# Patient Record
Sex: MALE | HISPANIC OR LATINO | Employment: FULL TIME | URBAN - METROPOLITAN AREA
[De-identification: names, ages, dates, MRNs, and addresses within clinical notes are randomized per-mention and may not be internally consistent; named-entity substitution may affect disease eponyms.]

---

## 2022-08-25 ENCOUNTER — OFFICE VISIT (OUTPATIENT)
Dept: FAMILY MEDICINE | Facility: CLINIC | Age: 37
End: 2022-08-25
Payer: COMMERCIAL

## 2022-08-25 VITALS
RESPIRATION RATE: 16 BRPM | TEMPERATURE: 97.5 F | DIASTOLIC BLOOD PRESSURE: 74 MMHG | WEIGHT: 169.6 LBS | HEART RATE: 76 BPM | OXYGEN SATURATION: 97 % | HEIGHT: 65 IN | BODY MASS INDEX: 28.26 KG/M2 | SYSTOLIC BLOOD PRESSURE: 111 MMHG

## 2022-08-25 DIAGNOSIS — R53.83 FATIGUE, UNSPECIFIED TYPE: ICD-10-CM

## 2022-08-25 DIAGNOSIS — Z11.4 SCREENING FOR HIV (HUMAN IMMUNODEFICIENCY VIRUS): ICD-10-CM

## 2022-08-25 DIAGNOSIS — Z13.220 LIPID SCREENING: ICD-10-CM

## 2022-08-25 DIAGNOSIS — H93.12 TINNITUS, LEFT: ICD-10-CM

## 2022-08-25 DIAGNOSIS — Z71.89 ADVANCED DIRECTIVES, COUNSELING/DISCUSSION: ICD-10-CM

## 2022-08-25 DIAGNOSIS — Z11.59 NEED FOR HEPATITIS C SCREENING TEST: ICD-10-CM

## 2022-08-25 DIAGNOSIS — R20.0 NUMBNESS OF FINGERS: ICD-10-CM

## 2022-08-25 DIAGNOSIS — Z00.00 ROUTINE GENERAL MEDICAL EXAMINATION AT A HEALTH CARE FACILITY: Primary | ICD-10-CM

## 2022-08-25 LAB
ALBUMIN SERPL-MCNC: 4.1 G/DL (ref 3.4–5)
ALP SERPL-CCNC: 71 U/L (ref 40–150)
ALT SERPL W P-5'-P-CCNC: 33 U/L (ref 0–70)
ANION GAP SERPL CALCULATED.3IONS-SCNC: 3 MMOL/L (ref 3–14)
AST SERPL W P-5'-P-CCNC: 26 U/L (ref 0–45)
BILIRUB SERPL-MCNC: 0.6 MG/DL (ref 0.2–1.3)
BUN SERPL-MCNC: 14 MG/DL (ref 7–30)
CALCIUM SERPL-MCNC: 9 MG/DL (ref 8.5–10.1)
CHLORIDE BLD-SCNC: 110 MMOL/L (ref 94–109)
CHOLEST SERPL-MCNC: 169 MG/DL
CO2 SERPL-SCNC: 26 MMOL/L (ref 20–32)
CREAT SERPL-MCNC: 1 MG/DL (ref 0.66–1.25)
ERYTHROCYTE [DISTWIDTH] IN BLOOD BY AUTOMATED COUNT: 13.1 % (ref 10–15)
FASTING STATUS PATIENT QL REPORTED: YES
GFR SERPL CREATININE-BSD FRML MDRD: >90 ML/MIN/1.73M2
GLUCOSE BLD-MCNC: 100 MG/DL (ref 70–99)
HCT VFR BLD AUTO: 44.1 % (ref 40–53)
HDLC SERPL-MCNC: 54 MG/DL
HGB BLD-MCNC: 15.3 G/DL (ref 13.3–17.7)
LDLC SERPL CALC-MCNC: 101 MG/DL
MCH RBC QN AUTO: 30 PG (ref 26.5–33)
MCHC RBC AUTO-ENTMCNC: 34.7 G/DL (ref 31.5–36.5)
MCV RBC AUTO: 87 FL (ref 78–100)
NONHDLC SERPL-MCNC: 115 MG/DL
PLATELET # BLD AUTO: 245 10E3/UL (ref 150–450)
POTASSIUM BLD-SCNC: 4.4 MMOL/L (ref 3.4–5.3)
PROT SERPL-MCNC: 7.4 G/DL (ref 6.8–8.8)
RBC # BLD AUTO: 5.1 10E6/UL (ref 4.4–5.9)
SODIUM SERPL-SCNC: 139 MMOL/L (ref 133–144)
TRIGL SERPL-MCNC: 68 MG/DL
TSH SERPL DL<=0.005 MIU/L-ACNC: 0.57 MU/L (ref 0.4–4)
VIT B12 SERPL-MCNC: 422 PG/ML (ref 232–1245)
WBC # BLD AUTO: 4.2 10E3/UL (ref 4–11)

## 2022-08-25 PROCEDURE — 99385 PREV VISIT NEW AGE 18-39: CPT | Performed by: FAMILY MEDICINE

## 2022-08-25 PROCEDURE — 85027 COMPLETE CBC AUTOMATED: CPT | Performed by: FAMILY MEDICINE

## 2022-08-25 PROCEDURE — 82607 VITAMIN B-12: CPT | Performed by: FAMILY MEDICINE

## 2022-08-25 PROCEDURE — 99214 OFFICE O/P EST MOD 30 MIN: CPT | Mod: 25 | Performed by: FAMILY MEDICINE

## 2022-08-25 PROCEDURE — 36415 COLL VENOUS BLD VENIPUNCTURE: CPT | Performed by: FAMILY MEDICINE

## 2022-08-25 PROCEDURE — 80061 LIPID PANEL: CPT | Performed by: FAMILY MEDICINE

## 2022-08-25 PROCEDURE — 86803 HEPATITIS C AB TEST: CPT | Performed by: FAMILY MEDICINE

## 2022-08-25 PROCEDURE — 87389 HIV-1 AG W/HIV-1&-2 AB AG IA: CPT | Performed by: FAMILY MEDICINE

## 2022-08-25 PROCEDURE — 92551 PURE TONE HEARING TEST AIR: CPT | Performed by: FAMILY MEDICINE

## 2022-08-25 PROCEDURE — 84443 ASSAY THYROID STIM HORMONE: CPT | Performed by: FAMILY MEDICINE

## 2022-08-25 PROCEDURE — 96127 BRIEF EMOTIONAL/BEHAV ASSMT: CPT | Performed by: FAMILY MEDICINE

## 2022-08-25 PROCEDURE — 80053 COMPREHEN METABOLIC PANEL: CPT | Performed by: FAMILY MEDICINE

## 2022-08-25 ASSESSMENT — ANXIETY QUESTIONNAIRES
7. FEELING AFRAID AS IF SOMETHING AWFUL MIGHT HAPPEN: NOT AT ALL
GAD7 TOTAL SCORE: 5
6. BECOMING EASILY ANNOYED OR IRRITABLE: MORE THAN HALF THE DAYS
2. NOT BEING ABLE TO STOP OR CONTROL WORRYING: SEVERAL DAYS
5. BEING SO RESTLESS THAT IT IS HARD TO SIT STILL: NOT AT ALL
3. WORRYING TOO MUCH ABOUT DIFFERENT THINGS: SEVERAL DAYS
GAD7 TOTAL SCORE: 5
IF YOU CHECKED OFF ANY PROBLEMS ON THIS QUESTIONNAIRE, HOW DIFFICULT HAVE THESE PROBLEMS MADE IT FOR YOU TO DO YOUR WORK, TAKE CARE OF THINGS AT HOME, OR GET ALONG WITH OTHER PEOPLE: VERY DIFFICULT
1. FEELING NERVOUS, ANXIOUS, OR ON EDGE: SEVERAL DAYS

## 2022-08-25 ASSESSMENT — ENCOUNTER SYMPTOMS
DIZZINESS: 1
HEARTBURN: 1
SHORTNESS OF BREATH: 0
PALPITATIONS: 0
HEMATOCHEZIA: 0
HEADACHES: 0
SORE THROAT: 0
EYE PAIN: 0
DYSURIA: 0
JOINT SWELLING: 0
DIARRHEA: 0
ARTHRALGIAS: 1
WEAKNESS: 1
FEVER: 0
CONSTIPATION: 0
FREQUENCY: 0
CHILLS: 0
NERVOUS/ANXIOUS: 0
NAUSEA: 0
MYALGIAS: 1
PARESTHESIAS: 1
ABDOMINAL PAIN: 0
HEMATURIA: 0
COUGH: 0

## 2022-08-25 ASSESSMENT — PATIENT HEALTH QUESTIONNAIRE - PHQ9
5. POOR APPETITE OR OVEREATING: NOT AT ALL
SUM OF ALL RESPONSES TO PHQ QUESTIONS 1-9: 9

## 2022-08-25 NOTE — PROGRESS NOTES
SUBJECTIVE:   CC: Rodri Emanuel is an 36 year old male who presents for preventative health visit.       Patient has been advised of split billing requirements and indicates understanding: Yes  Healthy Habits:     Getting at least 3 servings of Calcium per day:  Yes    Bi-annual eye exam:  NO    Dental care twice a year:  Yes    Sleep apnea or symptoms of sleep apnea:  Daytime drowsiness    Diet:  Regular (no restrictions)    Frequency of exercise:  None    Taking medications regularly:  Yes    Medication side effects:  Not applicable    PHQ-2 Total Score: 3    Additional concerns today:  Yes    New patient.    Asked patient- Anything else that will need to be discussed during this visit that I should make note of for the provider?                          Patient's Reply: yes    Numbness of fingers  Reporting new onset of symptoms that he has had since January or February of this year including numbness in hands/ fingers bilaterally- this occurs at night when is laying down, this improves once he gets up and is moving around.  Also having issues with fatigue and not feeling he has the energy like he used to.       Ear Problem- Left  Will hear a buzzing at times with left ear, this also will block the noise.     States that he is otherwise healthy and has no known chronic medical problems.  Is not on any chronic meds.    Today's PHQ-2 Score:   PHQ-2 ( 1999 [employer]) 8/25/2022   Q1: Little interest or pleasure in doing things 0   Q2: Feeling down, depressed or hopeless 0   PHQ-2 Score 0   Q1: Little interest or pleasure in doing things Not at all   Q2: Feeling down, depressed or hopeless Not at all   PHQ-2 Score 0       Abuse: Current or Past(Physical, Sexual or Emotional)- No  Do you feel safe in your environment? Yes    Have you ever done Advance Care Planning? (For example, a Health Directive, POLST, or a discussion with a medical provider or your loved ones about your wishes): No, advance care planning  information given to patient to review.  Patient plans to discuss their wishes with loved ones or provider.      Social History     Tobacco Use     Smoking status: Never Smoker     Smokeless tobacco: Never Used   Substance Use Topics     Alcohol use: Not Currently     If you drink alcohol do you typically have >3 drinks per day or >7 drinks per week? No    Alcohol Use 8/25/2022   Prescreen: >3 drinks/day or >7 drinks/week? Not Applicable   Prescreen: >3 drinks/day or >7 drinks/week? -   No flowsheet data found.    Last PSA: No results found for: PSA    Reviewed orders with patient. Reviewed health maintenance and updated orders accordingly - Yes  Lab work is in process  Labs reviewed in EPIC  BP Readings from Last 3 Encounters:   08/25/22 111/74    Wt Readings from Last 3 Encounters:   08/25/22 76.9 kg (169 lb 9.6 oz)                  Patient Active Problem List   Diagnosis     Tinnitus, left     Numbness of fingers     Past Surgical History:   Procedure Laterality Date     NO HISTORY OF SURGERY         Social History     Tobacco Use     Smoking status: Never Smoker     Smokeless tobacco: Never Used   Substance Use Topics     Alcohol use: Not Currently     Family History   Problem Relation Age of Onset     Diabetes Mother          No current outpatient medications on file.     No Known Allergies    Reviewed and updated as needed this visit by clinical staff   Tobacco  Allergies  Meds  Problems  Med Hx  Surg Hx  Fam Hx  Soc   Hx          Reviewed and updated as needed this visit by Provider   Tobacco    Problems  Med Hx  Surg Hx  Fam Hx               Review of Systems   Constitutional: Negative for chills and fever.   HENT: Positive for ear pain and hearing loss. Negative for congestion and sore throat.    Eyes: Negative for pain and visual disturbance.   Respiratory: Negative for cough and shortness of breath.    Cardiovascular: Negative for chest pain, palpitations and peripheral edema.  "  Gastrointestinal: Positive for heartburn. Negative for abdominal pain, constipation, diarrhea, hematochezia and nausea.   Genitourinary: Positive for impotence. Negative for dysuria, frequency, genital sores, hematuria, penile discharge and urgency.   Musculoskeletal: Positive for arthralgias and myalgias. Negative for joint swelling.   Skin: Negative for rash.   Neurological: Positive for dizziness, weakness and paresthesias. Negative for headaches.   Psychiatric/Behavioral: Positive for mood changes. The patient is not nervous/anxious.      OBJECTIVE:   /74   Pulse 76   Temp 97.5  F (36.4  C) (Tympanic)   Resp 16   Ht 1.66 m (5' 5.35\")   Wt 76.9 kg (169 lb 9.6 oz)   SpO2 97%   BMI 27.92 kg/m      Physical Exam  GENERAL: healthy, alert and no distress  EYES: Eyes grossly normal to inspection, PERRL and conjunctivae and sclerae normal  HENT: ear canals and TM's normal, nose and mouth without ulcers or lesions  NECK: no adenopathy, no asymmetry, masses, or scars and thyroid normal to palpation  RESP: lungs clear to auscultation - no rales, rhonchi or wheezes  CV: regular rate and rhythm, normal S1 S2, no S3 or S4, no murmur, click or rub, no peripheral edema and peripheral pulses strong  ABDOMEN: soft, nontender, no hepatosplenomegaly, no masses and bowel sounds normal.  Negative Tinel's and Phalen's test.  MS: no gross musculoskeletal defects noted, no edema  SKIN: no suspicious lesions or rashes  NEURO: Normal strength and tone, mentation intact and speech normal  PSYCH: mentation appears normal, affect normal/bright    Diagnostic Test Results:  Labs drawn and in process  Hearing screen was normal-see nursing notes for detail  ASSESSMENT/PLAN:   Rodri was seen today for physical.    Diagnoses and all orders for this visit:    Routine general medical examination at a health care facility  -     REVIEW OF HEALTH MAINTENANCE PROTOCOL ORDERS  -     TSH with free T4 reflex  -     Comprehensive metabolic " "panel (BMP + Alb, Alk Phos, ALT, AST, Total. Bili, TP)  -     CBC with platelets    Lipid screening  -     Lipid panel reflex to direct LDL Non-fasting    Screening for HIV (human immunodeficiency virus)  -     HIV Antigen Antibody Combo    Need for hepatitis C screening test  -     Hepatitis C Screen Reflex to HCV RNA Quant and Genotype    Tinnitus, left  -     HEARING SCREENING- normal    Fatigue, unspecified type  -     TSH with free T4 reflex  -     Comprehensive metabolic panel (BMP + Alb, Alk Phos, ALT, AST, Total. Bili, TP)  -     CBC with platelets  -     PHQ-9/LELE 7 completed, see Epic for details        Numbness of fingers- differentials to include -vitamin B12 deficiency, thyroid disorder or carpal tunnel syndrome  -     Vitamin B12  -     TSH with free T4 reflex  -     If labs are normal and symptoms persist will consider referral for nerve conduction studies    Advanced directives, counseling/discussion       -     I have verified the patient's ablity to prepare an advanced directive/make health care decisions.    Literature was provided to assist patient in preparing an advanced directive.        Patient has been advised of split billing requirements and indicates understanding: Yes    COUNSELING:   Reviewed preventive health counseling, as reflected in patient instructions       Regular exercise       Healthy diet/nutrition    Estimated body mass index is 27.92 kg/m  as calculated from the following:    Height as of this encounter: 1.66 m (5' 5.35\").    Weight as of this encounter: 76.9 kg (169 lb 9.6 oz).     Weight management plan: Discussed healthy diet and exercise guidelines    He reports that he has never smoked. He has never used smokeless tobacco.      Counseling Resources:  ATP IV Guidelines  Pooled Cohorts Equation Calculator  FRAX Risk Assessment  ICSI Preventive Guidelines  Dietary Guidelines for Americans, 2010  USDA's MyPlate  ASA Prophylaxis  Lung CA Screening    Follow up in 2 weeks " follow up to discuss next steps.   Next Annual Physical due in 8 /2023    Elizabet Durán MD  [facility]

## 2022-08-25 NOTE — NURSING NOTE
HEARING FREQUENCY    Right Ear:      1000 Hz RESPONSE- on Level: 40 db (Conditioning sound)   1000 Hz: RESPONSE- on Level:   20 db    2000 Hz: RESPONSE- on Level:   20 db    4000 Hz: RESPONSE- on Level:   20 db   6000 Hz: RESPONSE- on Level:   20 db   8000 Hz: RESPONSE- on Level:   20 db     Left Ear:     8000 Hz: RESPONSE- on Level:   20 db   6000 Hz: RESPONSE- on Level:   20 db    4000 Hz: RESPONSE- on Level:   20 db    2000 Hz: RESPONSE- on Level:   20 db    1000 Hz: RESPONSE- on Level:   20 db     500 Hz: RESPONSE- on Level: 25 db    Right Ear:    500 Hz: RESPONSE- on Level: 25 db    Hearing Acuity: Pass    Hearing Assessment: normal    Melvi Baez MA

## 2022-08-26 LAB
HCV AB SERPL QL IA: NONREACTIVE
HIV 1+2 AB+HIV1 P24 AG SERPL QL IA: NONREACTIVE

## 2022-08-30 ENCOUNTER — TELEPHONE (OUTPATIENT)
Dept: FAMILY MEDICINE | Facility: CLINIC | Age: 37
End: 2022-08-30

## 2022-08-30 DIAGNOSIS — R20.0 NUMBNESS OF FINGERS: Primary | ICD-10-CM

## 2022-08-30 NOTE — TELEPHONE ENCOUNTER
Left message on voice mail [phone] for patient to call clinic. [phone] see result note below per Dr Luis Armando Durán MD   8/30/2022  1:16 PM CDT         Rodri-     Your recent labs looked good.      Screening HIV and Hepatitis C screen tests were negative.   Complete blood count was normal.   Complete Metabolic Panel (panel that checks liver function, kidney function and electrolytes) was normal. Glucose was minimally elevated - no evidence of diabetes at this time.   Cholesterol panel was normal.     Vitamin B12 and Thyroid function tests were normal- I recommend we proceed with Nerve Conduction studies to further evaluate the numbness in your fingers.   If agreeable, let me know and I will send in a referral.      Please contact me if you have any additional questions or concerns.     Elizabet Durán MD

## 2022-08-31 NOTE — TELEPHONE ENCOUNTER
For EMG scheduling, [facility] will call you to coordinate your care as prescribed by your provider. If you don't hear from a representative within 2 business days, please call [phone].    Called to notify patient of the information above.  He stated that he has this scheduled already for Nov.    He would like to know if provider still wants to see him as scheduled on 9/9/22.    Lynda RN,BSN  Triage Nurse  [facility]: [place] [facility]  Ph: [phone]

## 2022-08-31 NOTE — TELEPHONE ENCOUNTER
Elizabet Durán MD 20 minutes ago (12:46 PM)     CB       Noted.      EMG orders completed.

## 2022-08-31 NOTE — TELEPHONE ENCOUNTER
Notified patient on his results below.  Patient declined .    Patient stated understanding and agreeable with the plan of care.    He stated that he will be willing to proceed with the nerve conduction studies, and would like a call back when referral is placed.    Routed to PCP.    Lynda DAVISON,BSN  Triage Nurse  [facility]: [facility]

## 2022-09-01 NOTE — TELEPHONE ENCOUNTER
Noted.     Can reschedule appointment to a week after EMG is completed to discuss results and next steps.

## 2022-11-04 ENCOUNTER — OFFICE VISIT (OUTPATIENT)
Dept: NEUROLOGY | Facility: CLINIC | Age: 37
End: 2022-11-04
Attending: FAMILY MEDICINE
Payer: COMMERCIAL

## 2022-11-04 DIAGNOSIS — R20.0 NUMBNESS OF FINGERS: ICD-10-CM

## 2022-11-04 PROCEDURE — 95886 MUSC TEST DONE W/N TEST COMP: CPT | Performed by: PHYSICAL MEDICINE & REHABILITATION

## 2022-11-04 PROCEDURE — 95910 NRV CNDJ TEST 7-8 STUDIES: CPT | Performed by: PHYSICAL MEDICINE & REHABILITATION

## 2022-11-04 NOTE — PROGRESS NOTES
[facility]  Electrodiagnostic Laboratory                 Department of Neurology                                                                                                         Test Date:  2022    Patient: Rodri Emanuel : 1985 Physician: Martha Spears MD   Sex: Male AGE: 37 year Ref Phys: Elizabet Durán MD   ID#: [id number]   Technician: Zuhair Gill     Clinical Information:  Rodri Emanuel is a 36 yo male who presents with paresthesia in his fingers b/l. Query peripheral mononeuropathies.     Techniques:  Motor and sensory conduction studies were done with surface recording electrodes. EMG was done with a concentric needle electrode.     Results:  All the nerve conduction studies (as indicated in the following tables) were within normal limits.      All examined muscles (as indicated in the following table) showed no evidence of electrical instability.        Interpretation:  Normal examination.    --There is no electrodiagnostic evidence of median or ulnar mononeuropathy in either upper extremity.    --There is no electrodiagnostic evidence of left upper extremity motor radiculopathy or plexopathy.      ___________________________  Martha Spears MD        Nerve Conduction Studies  Motor Sites      Latency Amplitude Neg. Amp Diff Segment Distance Velocity Neg. Dur Neg Area Diff Temperature Comment   Site (ms) Norm (mV) Norm %  cm m/s Norm ms %  C    Left Median (APB) Motor   Wrist 3.2  < 4.4 8.1  > 5.0  Wrist-APB 8   5.3  35    Elbow 7.5 - 8.1 - 0 Elbow-Wrist 22 51  > 48 5.1 -0.76 35.1    Right Median (APB) Motor   Wrist 3.3  < 4.4 8.5  > 5.0  Wrist-APB 8   5.5  33.3    Elbow 7.8 - 8.3 - -2.4 Elbow-Wrist 23.8 53  > 48 5.2 -6.5 33.3    Left Median/Ulnar (Lumb-Dors Int II) Motor        Median (Lumb I)   Wrist 3.1 - 1.45 -      5.7  35         Ulnar (Dorsal Int (manus))   Wrist 3.0 - 2.9 -      4.9  35.1    Right Median/Ulnar (Lumb-Dors  Int II) Motor        Median (Lumb I)   Wrist 3.2 - 1.69 -      5.6  33.4         Ulnar (Dorsal Int (manus))   Wrist 2.9 - 2.4 -      5.7  33.4    Left Ulnar (ADM) Motor   Wrist 2.7  < 3.5 13.1  > 5.0  Wrist-ADM 8   5.0  34    Bel Elbow 5.9 - 12.5 - -4.6 Bel Elbow-Wrist 19.2 60  > 48 5.1 -4.2 33.9    Abv Elbow 8.1 - 12.1 - -3.2 Abv Elbow-Bel Elbow 12.5 57  > 48 5.7 -2.0 33.7    Right Ulnar (ADM) Motor   Wrist 2.7  < 3.5 13.4  > 5.0  Wrist-ADM 8   4.7  32.8    Bel Elbow 6.4 - 12.5 - -6.7 Bel Elbow-Wrist 20.3 55  > 48 4.8 -3.9 32.7    Abv Elbow 8.6 - 11.7 - -6.4 Abv Elbow-Bel Elbow 12.4 56  > 48 5.0 -1.23 32.6      Sensory Sites      Onset Lat Peak Lat Amp (O-P) Amp (P-P) Segment Distance Velocity Temperature Comment   Site ms ms  V Norm  V  cm m/s Norm  C    Left Median Sensory   Wrist-Dig II 2.5 3.0 22  > 10 19 Wrist-Dig II 14 56  > 48 33.2    Right Median Sensory   Wrist-Dig II 2.6 3.3 13  > 10 23 Wrist-Dig II 14 54  > 48 32.8    Left Median-Ulnar Palmar Sensory        Median   Palm-Wrist 1.40 1.88 22 - 44 Palm-Wrist - - - 34.7         Ulnar   Palm-Wrist 1.35 1.78 3 - 7 Palm-Wrist - - - 34.9    Right Median-Ulnar Palmar Sensory        Median   Palm-Wrist 1.48 2.1 22 - 27 Palm-Wrist - - - 33.3         Ulnar   Palm-Wrist 1.30 2.0 8 - 4 Palm-Wrist - - - 33.3    Left Ulnar Sensory   Wrist-Dig V 2.4 3.1 13  > 8 9 Wrist-Dig V 12.5 52  > 48 33.8    Right Ulnar Sensory   Wrist-Dig V 2.3 3.2 22  > 8 12 Wrist-Dig V 12.5 54  > 48 33      Inter-Nerve Comparisons     Nerve 1 Value 1 Nerve 2 Value 2 Parameter Result Normal   Sensory Sites   R Median Palm-Wrist 2.1 ms R Ulnar Palm-Wrist 2.0 ms Peak Lat Diff 0.10 ms <0.40   L Median Palm-Wrist 1.9 ms L Ulnar Palm-Wrist 1.8 ms Peak Lat Diff 0.10 ms <0.40       Electromyography     Side Muscle Ins Act Fibs/PSW Fasc HF Amp Dur Poly Recrt Int Pat   Left Deltoid Nml None Nml 0 Nml Nml 0 Nml Nml   Left Biceps Nml None Nml 0 Nml Nml 0 Nml Nml   Left Triceps Nml None Nml 0 Nml Nml 0 Nml  Nml   Left Pronator Teres Nml None Nml 0 Nml Nml 0 Nml Nml   Left FDI Nml None Nml 0 Nml Nml 0 Nml Nml   Left EIP Nml None Nml 0 Nml Nml 0 Nml Nml         NCS Waveforms:    Motor                    Sensory

## 2022-11-04 NOTE — LETTER
2022       RE: Rodri Emanuel  [address]  [place] [address]     Dear Colleague,    Thank you for referring your patient, Rodri Emanuel, to the [facility] EMG CLINIC [place] at [facility]. Please see a copy of my visit note below.                            [facility]  Electrodiagnostic Laboratory                 Department of Neurology                                                                                                         Test Date:  2022    Patient: Rodri Emanuel : 1985 Physician: Martha Spears MD   Sex: Male AGE: 37 year Ref Phys: Elizabet Durán MD   ID#: [id number]   Technician: Zuhair Gill     Clinical Information:  Rodri Emanuel is a 38 yo male who presents with paresthesia in his fingers b/l. Query peripheral mononeuropathies.     Techniques:  Motor and sensory conduction studies were done with surface recording electrodes. EMG was done with a concentric needle electrode.     Results:  All the nerve conduction studies (as indicated in the following tables) were within normal limits.      All examined muscles (as indicated in the following table) showed no evidence of electrical instability.        Interpretation:  Normal examination.    --There is no electrodiagnostic evidence of median or ulnar mononeuropathy in either upper extremity.    --There is no electrodiagnostic evidence of left upper extremity motor radiculopathy or plexopathy.      ___________________________  Martha Spears MD        Nerve Conduction Studies  Motor Sites      Latency Amplitude Neg. Amp Diff Segment Distance Velocity Neg. Dur Neg Area Diff Temperature Comment   Site (ms) Norm (mV) Norm %  cm m/s Norm ms %  C    Left Median (APB) Motor   Wrist 3.2  < 4.4 8.1  > 5.0  Wrist-APB 8   5.3  35    Elbow 7.5 - 8.1 - 0 Elbow-Wrist 22 51  > 48 5.1 -0.76 35.1    Right Median (APB) Motor   Wrist 3.3   < 4.4 8.5  > 5.0  Wrist-APB 8   5.5  33.3    Elbow 7.8 - 8.3 - -2.4 Elbow-Wrist 23.8 53  > 48 5.2 -6.5 33.3    Left Median/Ulnar (Lumb-Dors Int II) Motor        Median (Lumb I)   Wrist 3.1 - 1.45 -      5.7  35         Ulnar (Dorsal Int (manus))   Wrist 3.0 - 2.9 -      4.9  35.1    Right Median/Ulnar (Lumb-Dors Int II) Motor        Median (Lumb I)   Wrist 3.2 - 1.69 -      5.6  33.4         Ulnar (Dorsal Int (manus))   Wrist 2.9 - 2.4 -      5.7  33.4    Left Ulnar (ADM) Motor   Wrist 2.7  < 3.5 13.1  > 5.0  Wrist-ADM 8   5.0  34    Bel Elbow 5.9 - 12.5 - -4.6 Bel Elbow-Wrist 19.2 60  > 48 5.1 -4.2 33.9    Abv Elbow 8.1 - 12.1 - -3.2 Abv Elbow-Bel Elbow 12.5 57  > 48 5.7 -2.0 33.7    Right Ulnar (ADM) Motor   Wrist 2.7  < 3.5 13.4  > 5.0  Wrist-ADM 8   4.7  32.8    Bel Elbow 6.4 - 12.5 - -6.7 Bel Elbow-Wrist 20.3 55  > 48 4.8 -3.9 32.7    Abv Elbow 8.6 - 11.7 - -6.4 Abv Elbow-Bel Elbow 12.4 56  > 48 5.0 -1.23 32.6      Sensory Sites      Onset Lat Peak Lat Amp (O-P) Amp (P-P) Segment Distance Velocity Temperature Comment   Site ms ms  V Norm  V  cm m/s Norm  C    Left Median Sensory   Wrist-Dig II 2.5 3.0 22  > 10 19 Wrist-Dig II 14 56  > 48 33.2    Right Median Sensory   Wrist-Dig II 2.6 3.3 13  > 10 23 Wrist-Dig II 14 54  > 48 32.8    Left Median-Ulnar Palmar Sensory        Median   Palm-Wrist 1.40 1.88 22 - 44 Palm-Wrist - - - 34.7         Ulnar   Palm-Wrist 1.35 1.78 3 - 7 Palm-Wrist - - - 34.9    Right Median-Ulnar Palmar Sensory        Median   Palm-Wrist 1.48 2.1 22 - 27 Palm-Wrist - - - 33.3         Ulnar   Palm-Wrist 1.30 2.0 8 - 4 Palm-Wrist - - - 33.3    Left Ulnar Sensory   Wrist-Dig V 2.4 3.1 13  > 8 9 Wrist-Dig V 12.5 52  > 48 33.8    Right Ulnar Sensory   Wrist-Dig V 2.3 3.2 22  > 8 12 Wrist-Dig V 12.5 54  > 48 33      Inter-Nerve Comparisons     Nerve 1 Value 1 Nerve 2 Value 2 Parameter Result Normal   Sensory Sites   R Median Palm-Wrist 2.1 ms R Ulnar Palm-Wrist 2.0 ms Peak Lat Diff 0.10 ms <0.40    L Median Palm-Wrist 1.9 ms L Ulnar Palm-Wrist 1.8 ms Peak Lat Diff 0.10 ms <0.40       Electromyography     Side Muscle Ins Act Fibs/PSW Fasc HF Amp Dur Poly Recrt Int Pat   Left Deltoid Nml None Nml 0 Nml Nml 0 Nml Nml   Left Biceps Nml None Nml 0 Nml Nml 0 Nml Nml   Left Triceps Nml None Nml 0 Nml Nml 0 Nml Nml   Left Pronator Teres Nml None Nml 0 Nml Nml 0 Nml Nml   Left FDI Nml None Nml 0 Nml Nml 0 Nml Nml   Left EIP Nml None Nml 0 Nml Nml 0 Nml Nml         NCS Waveforms:    Motor                    Sensory                            Again, thank you for allowing me to participate in the care of your patient.      Sincerely,    Martha Spears MD

## 2022-11-15 NOTE — PATIENT INSTRUCTIONS
Preventive Health Recommendations  Male Ages 26 - 39    Yearly exam:             See your health care provider every year in order to  o   Review health changes.   o   Discuss preventive care.    o   Review your medicines if your doctor has prescribed any.    You should be tested each year for STDs (sexually transmitted diseases), if you re at risk.     After age 35, talk to your provider about cholesterol testing. If you are at risk for heart disease, have your cholesterol tested at least every 5 years.     If you are at risk for diabetes, you should have a diabetes test (fasting glucose).  Shots: Get a flu shot each year. Get a tetanus shot every 10 years.     Nutrition:    Eat at least 5 servings of fruits and vegetables daily.     Eat whole-grain bread, whole-wheat pasta and brown rice instead of white grains and rice.     Get adequate Calcium and Vitamin D.     Lifestyle    Exercise for at least 150 minutes a week (30 minutes a day, 5 days a week). This will help you control your weight and prevent disease.     Limit alcohol to one drink per day.     No smoking.     Wear sunscreen to prevent skin cancer.     See your dentist every six months for an exam and cleaning.      hard copy, drawn during this pregnancy

## 2023-05-14 ENCOUNTER — HEALTH MAINTENANCE LETTER (OUTPATIENT)
Age: 38
End: 2023-05-14

## 2023-07-26 ENCOUNTER — PATIENT OUTREACH (OUTPATIENT)
Dept: CARE COORDINATION | Facility: CLINIC | Age: 38
End: 2023-07-26
Payer: COMMERCIAL

## 2023-08-09 ENCOUNTER — PATIENT OUTREACH (OUTPATIENT)
Dept: CARE COORDINATION | Facility: CLINIC | Age: 38
End: 2023-08-09
Payer: COMMERCIAL

## 2023-10-15 ENCOUNTER — HEALTH MAINTENANCE LETTER (OUTPATIENT)
Age: 38
End: 2023-10-15

## 2024-10-02 ENCOUNTER — HOSPITAL ENCOUNTER (EMERGENCY)
Facility: CLINIC | Age: 39
Discharge: HOME | End: 2024-10-02
Attending: NURSE PRACTITIONER | Admitting: NURSE PRACTITIONER
Payer: COMMERCIAL

## 2024-10-02 VITALS
HEIGHT: 66 IN | DIASTOLIC BLOOD PRESSURE: 95 MMHG | BODY MASS INDEX: 27.32 KG/M2 | OXYGEN SATURATION: 97 % | SYSTOLIC BLOOD PRESSURE: 142 MMHG | RESPIRATION RATE: 20 BRPM | WEIGHT: 170 LBS | HEART RATE: 84 BPM | TEMPERATURE: 98.2 F

## 2024-10-02 DIAGNOSIS — R42 LIGHTHEADEDNESS: ICD-10-CM

## 2024-10-02 DIAGNOSIS — M54.2 NECK PAIN: ICD-10-CM

## 2024-10-02 DIAGNOSIS — R03.0 ELEVATED BLOOD PRESSURE READING WITHOUT DIAGNOSIS OF HYPERTENSION: ICD-10-CM

## 2024-10-02 LAB
ALBUMIN SERPL BCG-MCNC: 4.5 G/DL (ref 3.5–5.2)
ALBUMIN UR-MCNC: NEGATIVE MG/DL
ALP SERPL-CCNC: 89 U/L (ref 40–150)
ALT SERPL W P-5'-P-CCNC: 72 U/L (ref 0–70)
ANION GAP SERPL CALCULATED.3IONS-SCNC: 15 MMOL/L (ref 7–15)
APPEARANCE UR: CLEAR
AST SERPL W P-5'-P-CCNC: 48 U/L (ref 0–45)
BACTERIA #/AREA URNS HPF: ABNORMAL /HPF
BASOPHILS # BLD AUTO: 0 10E3/UL (ref 0–0.2)
BASOPHILS NFR BLD AUTO: 0 %
BILIRUB SERPL-MCNC: 0.4 MG/DL
BILIRUB UR QL STRIP: NEGATIVE
BUN SERPL-MCNC: 9.6 MG/DL (ref 6–20)
CALCIUM SERPL-MCNC: 9.1 MG/DL (ref 8.8–10.4)
CHLORIDE SERPL-SCNC: 99 MMOL/L (ref 98–107)
COLOR UR AUTO: COLORLESS
CREAT SERPL-MCNC: 0.95 MG/DL (ref 0.67–1.17)
EGFRCR SERPLBLD CKD-EPI 2021: >90 ML/MIN/1.73M2
EOSINOPHIL # BLD AUTO: 0 10E3/UL (ref 0–0.7)
EOSINOPHIL NFR BLD AUTO: 1 %
ERYTHROCYTE [DISTWIDTH] IN BLOOD BY AUTOMATED COUNT: 12.2 % (ref 10–15)
EST. AVERAGE GLUCOSE BLD GHB EST-MCNC: 114 MG/DL
GLUCOSE SERPL-MCNC: 105 MG/DL (ref 70–99)
GLUCOSE UR STRIP-MCNC: NEGATIVE MG/DL
HBA1C MFR BLD: 5.6 %
HCO3 SERPL-SCNC: 22 MMOL/L (ref 22–29)
HCT VFR BLD AUTO: 43.7 % (ref 40–53)
HGB BLD-MCNC: 15.5 G/DL (ref 13.3–17.7)
HGB UR QL STRIP: NEGATIVE
IMM GRANULOCYTES # BLD: 0 10E3/UL
IMM GRANULOCYTES NFR BLD: 0 %
INR PPP: 1.15 (ref 0.85–1.15)
KETONES UR STRIP-MCNC: NEGATIVE MG/DL
LEUKOCYTE ESTERASE UR QL STRIP: NEGATIVE
LYMPHOCYTES # BLD AUTO: 1.6 10E3/UL (ref 0.8–5.3)
LYMPHOCYTES NFR BLD AUTO: 22 %
MCH RBC QN AUTO: 32 PG (ref 26.5–33)
MCHC RBC AUTO-ENTMCNC: 35.5 G/DL (ref 31.5–36.5)
MCV RBC AUTO: 90 FL (ref 78–100)
MONOCYTES # BLD AUTO: 0.7 10E3/UL (ref 0–1.3)
MONOCYTES NFR BLD AUTO: 10 %
NEUTROPHILS # BLD AUTO: 4.8 10E3/UL (ref 1.6–8.3)
NEUTROPHILS NFR BLD AUTO: 67 %
NITRATE UR QL: NEGATIVE
NRBC # BLD AUTO: 0 10E3/UL
NRBC BLD AUTO-RTO: 0 /100
PH UR STRIP: 7 [PH] (ref 5–7)
PLATELET # BLD AUTO: 283 10E3/UL (ref 150–450)
POTASSIUM SERPL-SCNC: 3.9 MMOL/L (ref 3.4–5.3)
PROT SERPL-MCNC: 7.1 G/DL (ref 6.4–8.3)
RBC # BLD AUTO: 4.84 10E6/UL (ref 4.4–5.9)
RBC URINE: 0 /HPF
SODIUM SERPL-SCNC: 136 MMOL/L (ref 135–145)
SP GR UR STRIP: 1 (ref 1–1.03)
UROBILINOGEN UR STRIP-MCNC: NORMAL MG/DL
WBC # BLD AUTO: 7.2 10E3/UL (ref 4–11)
WBC URINE: 0 /HPF

## 2024-10-02 PROCEDURE — 93010 ELECTROCARDIOGRAM REPORT: CPT | Performed by: NURSE PRACTITIONER

## 2024-10-02 PROCEDURE — 99284 EMERGENCY DEPT VISIT MOD MDM: CPT

## 2024-10-02 PROCEDURE — 99284 EMERGENCY DEPT VISIT MOD MDM: CPT | Performed by: NURSE PRACTITIONER

## 2024-10-02 PROCEDURE — 83036 HEMOGLOBIN GLYCOSYLATED A1C: CPT | Performed by: NURSE PRACTITIONER

## 2024-10-02 PROCEDURE — 258N000003 HC RX IP 258 OP 636: Performed by: NURSE PRACTITIONER

## 2024-10-02 PROCEDURE — 250N000013 HC RX MED GY IP 250 OP 250 PS 637: Performed by: NURSE PRACTITIONER

## 2024-10-02 PROCEDURE — 93005 ELECTROCARDIOGRAM TRACING: CPT

## 2024-10-02 PROCEDURE — 96361 HYDRATE IV INFUSION ADD-ON: CPT

## 2024-10-02 PROCEDURE — 36415 COLL VENOUS BLD VENIPUNCTURE: CPT | Performed by: NURSE PRACTITIONER

## 2024-10-02 PROCEDURE — 96360 HYDRATION IV INFUSION INIT: CPT

## 2024-10-02 PROCEDURE — 85610 PROTHROMBIN TIME: CPT | Performed by: NURSE PRACTITIONER

## 2024-10-02 PROCEDURE — 80053 COMPREHEN METABOLIC PANEL: CPT | Performed by: NURSE PRACTITIONER

## 2024-10-02 PROCEDURE — 85025 COMPLETE CBC W/AUTO DIFF WBC: CPT | Performed by: NURSE PRACTITIONER

## 2024-10-02 PROCEDURE — 81003 URINALYSIS AUTO W/O SCOPE: CPT | Performed by: NURSE PRACTITIONER

## 2024-10-02 RX ORDER — ACETAMINOPHEN 500 MG
1000 TABLET ORAL ONCE
Status: COMPLETED | OUTPATIENT
Start: 2024-10-02 | End: 2024-10-02

## 2024-10-02 RX ADMIN — ACETAMINOPHEN 1000 MG: 500 TABLET ORAL at 16:53

## 2024-10-02 RX ADMIN — SODIUM CHLORIDE, POTASSIUM CHLORIDE, SODIUM LACTATE AND CALCIUM CHLORIDE 1000 ML: 600; 310; 30; 20 INJECTION, SOLUTION INTRAVENOUS at 16:53

## 2024-10-02 ASSESSMENT — COLUMBIA-SUICIDE SEVERITY RATING SCALE - C-SSRS
1. IN THE PAST MONTH, HAVE YOU WISHED YOU WERE DEAD OR WISHED YOU COULD GO TO SLEEP AND NOT WAKE UP?: NO
6. HAVE YOU EVER DONE ANYTHING, STARTED TO DO ANYTHING, OR PREPARED TO DO ANYTHING TO END YOUR LIFE?: NO
2. HAVE YOU ACTUALLY HAD ANY THOUGHTS OF KILLING YOURSELF IN THE PAST MONTH?: NO

## 2024-10-02 ASSESSMENT — ACTIVITIES OF DAILY LIVING (ADL)
ADLS_ACUITY_SCORE: 35
ADLS_ACUITY_SCORE: 35
ADLS_ACUITY_SCORE: 33

## 2024-10-02 NOTE — ED PROVIDER NOTES
"  History     Chief Complaint   Patient presents with    Hypertension     HPI  Rodri Emanuel is a 39 year old male who presents with concerns of two week history of neck pain.  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  Pt states he has been sweating more frequently and easily.  He has been drinking supplements with his water that contain 1000 mg sodium.  Today he drank an energy drink Bubbler that contains 69 mg of caffeine.    He has seen the chiropractor twice for the neck and back pain and last week was feeling better.  Today after the chiropractor he felt lightheaded.   He has tried Ibuprofen 400 mg 5 times over the past two weeks with good relief.  Pt has not tried any topical creams.  He has not had previous neck or back surgery.  Pt denies personal hx of renal, hepatic disease, denies diabetes, cancer hx.    Allergies:  No Known Allergies    Problem List:    Patient Active Problem List    Diagnosis Date Noted    Tinnitus, left 08/25/2022     Priority: Medium    Numbness of fingers 08/25/2022     Priority: Medium        Past Medical History:    No past medical history on file.    Past Surgical History:    Past Surgical History:   Procedure Laterality Date    NO HISTORY OF SURGERY         Family History:    Family History   Problem Relation Age of Onset    Diabetes Mother        Social History:  Marital Status:  Single [1]  Social History     Tobacco Use    Smoking status: Never    Smokeless tobacco: Never   Vaping Use    Vaping status: Never Used   Substance Use Topics    Alcohol use: Not Currently    Drug use: Never    Reports 3 beers daily.    Medications:    No current outpatient medications on file.        Review of Systems  As mentioned above in the history present illness. All other systems were reviewed and are negative.    Physical Exam   BP: (!) 151/99  Pulse: 110  Temp: 98.2  F (36.8  C)  Resp: 20  Height: 167.6 cm (5' 6\")  Weight: 77.1 kg (170 lb)  SpO2: 98 %      Physical " Exam  Vitals and nursing note reviewed.   Constitutional:       General: He is not in acute distress.     Appearance: He is well-developed. He is not toxic-appearing or diaphoretic.   HENT:      Head: Normocephalic and atraumatic.      Right Ear: External ear normal.      Left Ear: External ear normal.      Nose: Nose normal.   Eyes:      General:         Right eye: No discharge.         Left eye: No discharge.      Conjunctiva/sclera: Conjunctivae normal.   Cardiovascular:      Rate and Rhythm: Normal rate and regular rhythm.      Heart sounds: Normal heart sounds. No murmur heard.     No friction rub. No gallop.   Pulmonary:      Effort: Pulmonary effort is normal.      Breath sounds: Normal breath sounds. No stridor. No wheezing.   Abdominal:      General: Bowel sounds are normal. There is no distension.      Palpations: Abdomen is soft.      Tenderness: There is no abdominal tenderness. There is no guarding or rebound.   Musculoskeletal:      Cervical back: Tenderness (bilateral sternocleidomastoid muscle spasm/tenderness and bilateral trapeziusm muscle spasm and tenderness) present. No rigidity.   Lymphadenopathy:      Cervical: No cervical adenopathy.   Skin:     General: Skin is warm.      Findings: No rash.   Neurological:      Mental Status: He is alert and oriented to person, place, and time.   Psychiatric:         Mood and Affect: Mood normal.         Behavior: Behavior normal.         ED Course     ED Course as of 10/02/24 1852   Wed Oct 02, 2024   1652 EKG 12-lead, tracing only  EKG reveals sinus rhythm.  Normal axis, heart rate 90, no acute ST segment elevation or depression noted.  No previous EKG to compare to.  This EKG was read and reviewed by Dr. Leo Boone   1740 Comprehensive metabolic panel(!)  Comprehensive metabolic panel reveals no electrolyte derangement.  Glucose is 105, AST and ALT are mildly elevated at 48 and 72.  Given patient's discussion of 2-3+ beers daily suspect that this  may be possible etiology.   1740 CBC with platelets differential  CBC is unremarkable.   1740 INR  INR is normal at 1.15   1828 UA with Microscopic reflex to Culture(!)  UA is reassuring.   1828 Hemoglobin A1c  Hemoglobin is 5.6 no signs of diabetes.     Procedures      Results for orders placed or performed during the hospital encounter of 10/02/24 (from the past 24 hour(s))   CBC with platelets differential    Narrative    The following orders were created for panel order CBC with platelets differential.  Procedure                               Abnormality         Status                     ---------                               -----------         ------                     CBC with platelets and d...[[id number]]                      Final result                 Please view results for these tests on the individual orders.   Comprehensive metabolic panel   Result Value Ref Range    Sodium 136 135 - 145 mmol/L    Potassium 3.9 3.4 - 5.3 mmol/L    Carbon Dioxide (CO2) 22 22 - 29 mmol/L    Anion Gap 15 7 - 15 mmol/L    Urea Nitrogen 9.6 6.0 - 20.0 mg/dL    Creatinine 0.95 0.67 - 1.17 mg/dL    GFR Estimate >90 >60 mL/min/1.73m2    Calcium 9.1 8.8 - 10.4 mg/dL    Chloride 99 98 - 107 mmol/L    Glucose 105 (H) 70 - 99 mg/dL    Alkaline Phosphatase 89 40 - 150 U/L    AST 48 (H) 0 - 45 U/L    ALT 72 (H) 0 - 70 U/L    Protein Total 7.1 6.4 - 8.3 g/dL    Albumin 4.5 3.5 - 5.2 g/dL    Bilirubin Total 0.4 <=1.2 mg/dL   INR   Result Value Ref Range    INR 1.15 0.85 - 1.15   CBC with platelets and differential   Result Value Ref Range    WBC Count 7.2 4.0 - 11.0 10e3/uL    RBC Count 4.84 4.40 - 5.90 10e6/uL    Hemoglobin 15.5 13.3 - 17.7 g/dL    Hematocrit 43.7 40.0 - 53.0 %    MCV 90 78 - 100 fL    MCH 32.0 26.5 - 33.0 pg    MCHC 35.5 31.5 - 36.5 g/dL    RDW 12.2 10.0 - 15.0 %    Platelet Count 283 150 - 450 10e3/uL    % Neutrophils 67 %    % Lymphocytes 22 %    % Monocytes 10 %    % Eosinophils 1 %    % Basophils 0 %    %  Immature Granulocytes 0 %    NRBCs per 100 WBC 0 <1 /100    Absolute Neutrophils 4.8 1.6 - 8.3 10e3/uL    Absolute Lymphocytes 1.6 0.8 - 5.3 10e3/uL    Absolute Monocytes 0.7 0.0 - 1.3 10e3/uL    Absolute Eosinophils 0.0 0.0 - 0.7 10e3/uL    Absolute Basophils 0.0 0.0 - 0.2 10e3/uL    Absolute Immature Granulocytes 0.0 <=0.4 10e3/uL    Absolute NRBCs 0.0 10e3/uL   Hemoglobin A1c   Result Value Ref Range    Estimated Average Glucose 114 <117 mg/dL    Hemoglobin A1C 5.6 <5.7 %   UA with Microscopic reflex to Culture    Specimen: Urine, Midstream   Result Value Ref Range    Color Urine Colorless Colorless, Straw, Light Yellow, Yellow    Appearance Urine Clear Clear    Glucose Urine Negative Negative mg/dL    Bilirubin Urine Negative Negative    Ketones Urine Negative Negative mg/dL    Specific Gravity Urine 1.002 (L) 1.003 - 1.035    Blood Urine Negative Negative    pH Urine 7.0 5.0 - 7.0    Protein Albumin Urine Negative Negative mg/dL    Urobilinogen Urine Normal Normal, 2.0 mg/dL    Nitrite Urine Negative Negative    Leukocyte Esterase Urine Negative Negative    Bacteria Urine Few (A) None Seen /HPF    RBC Urine 0 <=2 /HPF    WBC Urine 0 <=5 /HPF    Narrative    Urine Culture not indicated       Medications   lactated ringers BOLUS 1,000 mL (0 mLs Intravenous Stopped 10/2/24 1845)   acetaminophen (TYLENOL) tablet 1,000 mg (1,000 mg Oral $Given 10/2/24 1653)       Assessments & Plan (with Medical Decision Making)     I have reviewed the nursing notes.    I have reviewed the findings, diagnosis, plan and need for follow up with the patient.  Rodri Emanuel is a 39 year old male who presents with concerns of two week history of neck pain.  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  Pt states he has been sweating more frequently and easily.  He has been drinking supplements with his water that contain 1000 mg sodium.  Today he drank an energy drink Bubbler that contains 69 mg of caffeine and after  drinking this and going to the chiropractor he reports feeling lightheaded when rising quickly.  He is concerned about his blood pressure.  He reports he does previously have a primary care provider but does not recall the last time he was in to be seen.  He has never been advised that he needs blood pressure medication.  He reports drinking 2-3 beers daily.  On exam blood pressure 151/99, temp 98.2, pulse 110, respiratory rate is 20, O2 saturation 98%.  Heart sounds are regular S1-S2 no murmurs gallops noted, lung sounds are clear to auscultation.  Patient has tenderness at the sternocleidomastoid muscle and trapezius muscles bilaterally.  There is no nuchal rigidity, Brudzinski's is negative Kernig is negative.  Differential diagnoses cardiac dysrhythmia, electrolyte dysfunction, dehydration, suspect neck and trapezius is musculoskeletal as patient denies any trauma.  No signs of radiculopathy and strength is equal bilaterally in his hands.  Workup reveals mild transaminitis and consistent with alcohol drinks of 2-4 drinks beer daily.  No acute electrolyte derangement, no signs of diabetes mellitus at this time.  Suspect that patient's elevated blood pressure reading may be related to excess caffeine.  Reviewed these findings with patient.  No signs of a meningitis as Brudzinski's and Kernig's is negative.  Will discuss muscle pain and offer topical agent for pain management.  As well.  Patient agreeable to all of the above and discharged in stable condition.    There are no discharge medications for this patient.      Final diagnoses:   Neck pain   Lightheadedness   Elevated blood pressure reading without diagnosis of hypertension       10/2/2024   [facility] EMERGENCY DEPT       [place], ANY Brice CNP  10/02/24 5232

## 2024-10-02 NOTE — DISCHARGE INSTRUCTIONS
You are seen in the emergency room today for neck pain, lightheadedness, increased sweating, elevated blood pressure reading.  I recommend after evaluation today that you obtain a blood pressure once daily.  I recommend no more than 3 cups of coffee daily.  I recommend minimizing alcohol to 1 beer daily.  I recommend avoiding energy drinks, sodium water.  I recommend drinking plenty of water daily.  The urinalysis today shows that you are drinking plenty of water.  Your heart rate was a little fast today and that may be due to excess caffeine.  Your liver enzymes were slightly elevated today I recommend follow-up and repeat the liver enzymes when you establish care with your primary care provider.  If you go to that visit and are fasting they can also check your cholesterol at that time.   thank you for coming to the emergency room tonight.

## 2024-10-02 NOTE — ED NOTES
Pt c/o HTN, lightheadedness and neck pain (for 2 weeks from work).  Pt hypertensive in department.  A&Ox4.  Wife at bedside.

## 2024-10-02 NOTE — ED TRIAGE NOTES
C/o neck pain for 2 weeks from work  Also c/o elevated blood pressure. Today at work was 143/91     Triage Assessment (Adult)       Row Name 10/02/24 1449          Triage Assessment    Airway WDL WDL        Respiratory WDL    Respiratory WDL WDL        Peripheral/Neurovascular WDL    Peripheral Neurovascular WDL WDL        Cognitive/Neuro/Behavioral WDL    Cognitive/Neuro/Behavioral WDL WDL

## 2024-10-09 ENCOUNTER — OFFICE VISIT (OUTPATIENT)
Dept: FAMILY MEDICINE | Facility: CLINIC | Age: 39
End: 2024-10-09
Attending: NURSE PRACTITIONER
Payer: COMMERCIAL

## 2024-10-09 VITALS
DIASTOLIC BLOOD PRESSURE: 72 MMHG | WEIGHT: 170 LBS | OXYGEN SATURATION: 98 % | RESPIRATION RATE: 18 BRPM | HEART RATE: 68 BPM | HEIGHT: 65 IN | TEMPERATURE: 97.8 F | SYSTOLIC BLOOD PRESSURE: 124 MMHG | BODY MASS INDEX: 28.32 KG/M2

## 2024-10-09 DIAGNOSIS — R42 LIGHTHEADEDNESS: ICD-10-CM

## 2024-10-09 DIAGNOSIS — R03.0 ELEVATED BLOOD PRESSURE READING WITHOUT DIAGNOSIS OF HYPERTENSION: ICD-10-CM

## 2024-10-09 DIAGNOSIS — R74.01 ELEVATED LIVER TRANSAMINASE LEVEL: ICD-10-CM

## 2024-10-09 DIAGNOSIS — M54.2 NECK PAIN: ICD-10-CM

## 2024-10-09 DIAGNOSIS — Z13.220 LIPID SCREENING: Primary | ICD-10-CM

## 2024-10-09 LAB
ALBUMIN SERPL BCG-MCNC: 4.4 G/DL (ref 3.5–5.2)
ALP SERPL-CCNC: 75 U/L (ref 40–150)
ALT SERPL W P-5'-P-CCNC: 86 U/L (ref 0–70)
AST SERPL W P-5'-P-CCNC: 48 U/L (ref 0–45)
BILIRUB DIRECT SERPL-MCNC: <0.2 MG/DL (ref 0–0.3)
BILIRUB SERPL-MCNC: 0.4 MG/DL
CHOLEST SERPL-MCNC: 172 MG/DL
FASTING STATUS PATIENT QL REPORTED: YES
HDLC SERPL-MCNC: 57 MG/DL
LDLC SERPL CALC-MCNC: 97 MG/DL
NONHDLC SERPL-MCNC: 115 MG/DL
PROT SERPL-MCNC: 6.9 G/DL (ref 6.4–8.3)
TRIGL SERPL-MCNC: 91 MG/DL

## 2024-10-09 PROCEDURE — 80076 HEPATIC FUNCTION PANEL: CPT | Performed by: PHYSICIAN ASSISTANT

## 2024-10-09 PROCEDURE — 99214 OFFICE O/P EST MOD 30 MIN: CPT | Performed by: PHYSICIAN ASSISTANT

## 2024-10-09 PROCEDURE — 80061 LIPID PANEL: CPT | Performed by: PHYSICIAN ASSISTANT

## 2024-10-09 PROCEDURE — 36415 COLL VENOUS BLD VENIPUNCTURE: CPT | Performed by: PHYSICIAN ASSISTANT

## 2024-10-09 RX ORDER — METHOCARBAMOL 750 MG/1
375-750 TABLET, FILM COATED ORAL
Qty: 15 TABLET | Refills: 0 | Status: SHIPPED | OUTPATIENT
Start: 2024-10-09

## 2024-10-09 ASSESSMENT — PAIN SCALES - GENERAL: PAINLEVEL: MODERATE PAIN (5)

## 2024-10-09 NOTE — PROGRESS NOTES
Assessment & Plan   Problem List Items Addressed This Visit    None  Visit Diagnoses       Lipid screening    -  Primary    Relevant Orders    Lipid panel reflex to direct LDL Fasting    Neck pain        Relevant Medications    methocarbamol (ROBAXIN) 750 MG tablet    Other Relevant Orders    Physical Therapy  Referral    Lightheadedness        Elevated blood pressure reading without diagnosis of hypertension        Elevated liver transaminase level        Relevant Orders    Hepatic panel (Albumin, ALT, AST, Bili, Alk Phos, TP)           Assessment & Plan  Cervicalgia and dizziness  - Likely cervical muscle strain secondary to occupational physical exertion and working at odd angle. Differential diagnosis includes less likely etiologies such as CNS infections or vascular abnormalities. Considered neck vessel dissection, but no dizziness/lightheadedness or other neuro symptoms for the last week and pain is improving.  - Continue with non-prescription analgesics as needed. Referral to physical therapy for therapeutic exercises and stretching. Prescribed a muscle relaxant for nocturnal use. If symptoms exacerbate or dizziness recurs, consider a cervical CT scan. Pt declined CTA today and I am in agreement with this. Monitor blood pressure.     Elevated hepatic enzymes  - Mild hepatic enzyme abnormalities, potentially secondary to ethanol consumption.  - Advise reduction of ethanol intake and re-evaluate hepatic enzyme levels today.    Complete history and physical exam as below. Afebrile with normal vital sign.    DDx and Dx discussed with and explained to the pt to their satisfaction.  All questions were answered at this time. Pt expressed understanding of and agreement with this dx, tx, and plan. No further workup warranted and standard medication warnings given. I have given the patient a list of pertinent indications for re-evaluation. Will go to the Emergency Department if symptoms worsen or new  "concerning symptoms arise. Patient left in no apparent distress.   Ordering of each unique test  Prescription drug management  36 minutes spent by me on the date of the encounter doing chart review, history and exam, documentation and further activities per the note   MED REC REQUIRED  Post Medication Reconciliation Status: discharge medications reconciled, continue medications without change  BMI  Estimated body mass index is 27.88 kg/m  as calculated from the following:    Height as of this encounter: 1.663 m (5' 5.47\").    Weight as of this encounter: 77.1 kg (170 lb).       See Patient Instructions      Rosa Mace is a 39 year old, presenting for the following health issues:  Neck Pain and Hypertension        10/9/2024     9:19 AM   Additional Questions   Roomed by Maria M Krause CMA   Accompanied by N/A         10/9/2024   Forms   Any forms needing to be completed Yes          10/9/2024     9:19 AM   Patient Reported Additional Medications   Patient reports taking the following new medications No new medications     HPI     Patient is coming in today due to neck pain and high blood pressure.  On Wednesday the neck pain was at its worst. There has been neck pain, cracking, and sometimes stiffness.  No injury noted but patient had been working in the same position for awhile.    Patient would also like to do cholesterol testing today as he was unable to do so at urgent care (patient was not fasting that day, but is today)  - Patient reports neck pain on the right side, more sore than the left side  - Pain started around the end of September during a period of hot weather  - Patient was working in a physically demanding job, sweating heavily  - Experienced lightheadedness that came and went, but has not returned since visiting the ER one week ago  - Neck pain became significantly worse after a visit to the chiropractor  - Pain described as a strong pulling sensation in the muscle  - Pain is worse with " "certain movements, particularly when mowing grass  - Pain onset was sudden, not gradual  - Pain is currently around a 4 or 5 out of 10  - Patient also reports numbness and tingling in hand when holding phone in a certain position, but resolves when he stops the offending action.  -feels he is gradually improving and denies vision changes, current lightheadedness/numbness/tingling, chest pain, sob, or other symptoms.    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      Objective    /72   Pulse 68   Temp 97.8  F (36.6  C) (Temporal)   Resp 18   Ht 1.663 m (5' 5.47\")   Wt 77.1 kg (170 lb)   SpO2 98%   BMI 27.88 kg/m    Body mass index is 27.88 kg/m .  Physical Exam  Vitals and nursing note reviewed.   Constitutional:       General: He is not in acute distress.     Appearance: He is not ill-appearing or diaphoretic.   HENT:      Head: Normocephalic and atraumatic.      Nose: Nose normal.      Mouth/Throat:      Mouth: Mucous membranes are moist.      Pharynx: Oropharynx is clear.   Eyes:      Extraocular Movements: Extraocular movements intact.      Conjunctiva/sclera: Conjunctivae normal.      Pupils: Pupils are equal, round, and reactive to light.   Neck:      Vascular: No carotid bruit.      Comments: Tenderness of left trapezius muscles.  No overlying signs of trauma or infection. Limited ROM to the left with rotation.   Cardiovascular:      Rate and Rhythm: Normal rate and regular rhythm.      Heart sounds: Normal heart sounds. No murmur heard.     No friction rub. No gallop.   Pulmonary:      Effort: Pulmonary effort is normal. No respiratory distress.    "   Breath sounds: Normal breath sounds. No stridor. No wheezing, rhonchi or rales.   Musculoskeletal:      Cervical back: Neck supple. No rigidity.   Lymphadenopathy:      Cervical: No cervical adenopathy.   Skin:     General: Skin is warm and dry.   Neurological:      General: No focal deficit present.      Mental Status: He is alert. Mental status is at baseline.      Comments: Negative finger-nose-finger, heel-to-shin and pronator drift.  Face symmetric.  Speech clear. CN 2-12 intact. Normal strength and sensation in face and upper/lower extremities bilaterally.   Psychiatric:         Mood and Affect: Mood normal.         Behavior: Behavior normal.                Signed Electronically by: MALINDA Thakkar

## 2024-10-12 ENCOUNTER — MYC MEDICAL ADVICE (OUTPATIENT)
Dept: FAMILY MEDICINE | Facility: CLINIC | Age: 39
End: 2024-10-12
Payer: COMMERCIAL

## 2024-12-08 ENCOUNTER — HEALTH MAINTENANCE LETTER (OUTPATIENT)
Age: 39
End: 2024-12-08